# Patient Record
Sex: MALE | Race: WHITE | Employment: OTHER | URBAN - METROPOLITAN AREA
[De-identification: names, ages, dates, MRNs, and addresses within clinical notes are randomized per-mention and may not be internally consistent; named-entity substitution may affect disease eponyms.]

---

## 2017-10-09 ENCOUNTER — HOSPITAL ENCOUNTER (OUTPATIENT)
Dept: PHYSICAL THERAPY | Age: 57
Discharge: HOME | End: 2017-10-31
Attending: ORTHOPAEDIC SURGERY | Admitting: ORTHOPAEDIC SURGERY

## 2017-10-09 ENCOUNTER — OFFICE VISIT (OUTPATIENT)
Dept: ORTHOPEDIC SURGERY | Age: 57
End: 2017-10-09

## 2017-10-09 VITALS
HEIGHT: 72 IN | WEIGHT: 220 LBS | DIASTOLIC BLOOD PRESSURE: 75 MMHG | BODY MASS INDEX: 29.8 KG/M2 | SYSTOLIC BLOOD PRESSURE: 134 MMHG

## 2017-10-09 DIAGNOSIS — M25.511 RIGHT SHOULDER PAIN, UNSPECIFIED CHRONICITY: Primary | ICD-10-CM

## 2017-10-09 DIAGNOSIS — M75.121 COMPLETE TEAR OF RIGHT ROTATOR CUFF: ICD-10-CM

## 2017-10-09 PROCEDURE — L3670 SO ACRO/CLAV CAN WEB PRE OTS: HCPCS | Performed by: ORTHOPAEDIC SURGERY

## 2017-10-09 PROCEDURE — 73030 X-RAY EXAM OF SHOULDER: CPT | Performed by: ORTHOPAEDIC SURGERY

## 2017-10-09 PROCEDURE — 99203 OFFICE O/P NEW LOW 30 MIN: CPT | Performed by: ORTHOPAEDIC SURGERY

## 2017-10-09 NOTE — PROGRESS NOTES
time, place and person. The patient's mood and affect are appropriate. Neurological: The patient has good coordination. There is no weakness or sensory deficit. Shoulder Examination:  Inspection:  No gross deformities noted. No atrophy, erythema or ecchymosis. Skin is intact. Palpation:  Slightly tenderness to palpation diffusely    Range of Motion:  Significantly limited range of motion. Forward flexion to approximately 80°, abduction to approximately 45°    Strength:  Generalized weakness noted    Special Tests:  No gross instability noted    Skin: There are no rashes, ulcerations or lesions. Additional Comments: Sensation is intact to light touch throughout the median, ulnar and radial nerve distribution. Able to wiggle fingers, give thumbs up, A-OK and cross index and middle fingers. Additional Examinations:  Left Upper Extremity: Examination of the left upper extremity does not show any tenderness, deformity or injury. Range of motion is within normal limits. There is no gross instability. There are no rashes, ulcerations or lesions. Strength and tone are normal.    XR SHOULDER RIGHT STANDARD  Diagnostic Test Findings:    4 views of the right shoulder show minimal arthritic changes, no fracture or dislocation    MRI of the right shoulder from October 5, 2017 shows a complete tear of the supraspinatus tendon, as well as a subscapularis tear of the middle 3rd. Assessment:  Right shoulder rotator cuff tear    Impression:  Encounter Diagnoses   Name Primary?  Right shoulder pain, unspecified chronicity Yes    Complete tear of right rotator cuff        Office Procedures:  Orders Placed This Encounter   Procedures    XR SHOULDER RIGHT (MIN 2 VIEWS)     [id number]     Order Specific Question:   Reason for exam:     Answer:   Pain       Treatment Plan:  After going over the MRI results with the patient today is elected to proceed with surgical intervention.   We'll plan for a right shoulder video arthroscopy with rotator cuff repair and subacromial decompression. After discussion today, the patient has elected to proceed with surgical intervention. The surgical procedure was discussed in detail. The risks and possible complications of the surgery in general, as well as those directly related to this procedure were reviewed today. General risks include but are not limited to persistent pain, infection, excessive blood loss, neurovascular injury, chronic swelling or edema, and the potential need for additional treatment or surgical intervention in the future. The patient understands that, again, the risks and possible complications are not limited to those specifically stated above. In addition today, we discussed the preoperative and postoperative protocol, the often lengthy recovery, and the expectation that the patient will be compliant with instructions and perform the appropriate rehab program as prescribed. The patient accepted the above risks, possible complications, and protocol and elects to proceed. All questions were answered appropriately, and they understand that they can contact the office at any time to further discuss any questions or concerns. Steven Ghotra PA-C, scribing for Dr. Rico Rivera        This dictation was performed with a verbal recognition program [facility]) and it was checked for errors. It is possible that there are still dictated errors within this office note. If so, please bring any errors to my attention for an addendum. All efforts were made to ensure that this office note is accurate.

## 2017-10-11 ENCOUNTER — TELEPHONE (OUTPATIENT)
Dept: ORTHOPEDIC SURGERY | Age: 57
End: 2017-10-11

## 2017-10-11 NOTE — TELEPHONE ENCOUNTER
CPT [id number], [id number] [facility] # [id number]    Date 10-31-17  Rt shoulder

## 2017-10-11 NOTE — ADDENDUM NOTE
Encounter addended by: Debbe Leventhal on: 10/11/2017  8:39 AM<BR>    Actions taken: Letter status changed

## 2017-10-31 ENCOUNTER — HOSPITAL ENCOUNTER (OUTPATIENT)
Dept: SURGERY | Age: 57
Discharge: HOME | End: 2017-10-31
Attending: ORTHOPAEDIC SURGERY | Admitting: ORTHOPAEDIC SURGERY

## 2017-10-31 VITALS
BODY MASS INDEX: 31.64 KG/M2 | HEART RATE: 78 BPM | DIASTOLIC BLOOD PRESSURE: 58 MMHG | SYSTOLIC BLOOD PRESSURE: 131 MMHG | TEMPERATURE: 97.2 F | RESPIRATION RATE: 20 BRPM | WEIGHT: 221 LBS | OXYGEN SATURATION: 99 % | HEIGHT: 70 IN

## 2017-10-31 RX ORDER — MORPHINE SULFATE 2 MG/ML
2 INJECTION, SOLUTION INTRAMUSCULAR; INTRAVENOUS EVERY 5 MIN PRN
Status: DISCONTINUED | OUTPATIENT
Start: 2017-10-31 | End: 2017-11-01 | Stop reason: HOSPADM

## 2017-10-31 RX ORDER — ACETAMINOPHEN 10 MG/ML
INJECTION, SOLUTION INTRAVENOUS
Status: COMPLETED
Start: 2017-10-31 | End: 2017-10-31

## 2017-10-31 RX ORDER — SODIUM CHLORIDE 0.9 % (FLUSH) 0.9 %
10 SYRINGE (ML) INJECTION PRN
Status: DISCONTINUED | OUTPATIENT
Start: 2017-10-31 | End: 2017-11-01 | Stop reason: HOSPADM

## 2017-10-31 RX ORDER — OXYCODONE HYDROCHLORIDE AND ACETAMINOPHEN 5; 325 MG/1; MG/1
1 TABLET ORAL EVERY 6 HOURS PRN
Qty: 28 TABLET | Refills: 0 | Status: SHIPPED | OUTPATIENT
Start: 2017-10-31 | End: 2017-11-07

## 2017-10-31 RX ORDER — LIDOCAINE HYDROCHLORIDE 10 MG/ML
1 INJECTION, SOLUTION EPIDURAL; INFILTRATION; INTRACAUDAL; PERINEURAL
Status: ACTIVE | OUTPATIENT
Start: 2017-10-31 | End: 2017-10-31

## 2017-10-31 RX ORDER — PROMETHAZINE HYDROCHLORIDE 25 MG/ML
6.25 INJECTION, SOLUTION INTRAMUSCULAR; INTRAVENOUS
Status: ACTIVE | OUTPATIENT
Start: 2017-10-31 | End: 2017-10-31

## 2017-10-31 RX ORDER — SODIUM CHLORIDE 0.9 % (FLUSH) 0.9 %
10 SYRINGE (ML) INJECTION EVERY 12 HOURS SCHEDULED
Status: DISCONTINUED | OUTPATIENT
Start: 2017-10-31 | End: 2017-11-01 | Stop reason: HOSPADM

## 2017-10-31 RX ORDER — LABETALOL HYDROCHLORIDE 5 MG/ML
5 INJECTION, SOLUTION INTRAVENOUS EVERY 10 MIN PRN
Status: DISCONTINUED | OUTPATIENT
Start: 2017-10-31 | End: 2017-11-01 | Stop reason: HOSPADM

## 2017-10-31 RX ORDER — OXYCODONE HYDROCHLORIDE AND ACETAMINOPHEN 5; 325 MG/1; MG/1
1 TABLET ORAL PRN
Status: COMPLETED | OUTPATIENT
Start: 2017-10-31 | End: 2017-10-31

## 2017-10-31 RX ORDER — ACETAMINOPHEN 10 MG/ML
1000 INJECTION, SOLUTION INTRAVENOUS ONCE
Status: DISCONTINUED | OUTPATIENT
Start: 2017-10-31 | End: 2017-11-01 | Stop reason: HOSPADM

## 2017-10-31 RX ORDER — MORPHINE SULFATE 2 MG/ML
1 INJECTION, SOLUTION INTRAMUSCULAR; INTRAVENOUS EVERY 5 MIN PRN
Status: DISCONTINUED | OUTPATIENT
Start: 2017-10-31 | End: 2017-11-01 | Stop reason: HOSPADM

## 2017-10-31 RX ORDER — MEPERIDINE HYDROCHLORIDE 50 MG/ML
12.5 INJECTION INTRAMUSCULAR; INTRAVENOUS; SUBCUTANEOUS EVERY 5 MIN PRN
Status: DISCONTINUED | OUTPATIENT
Start: 2017-10-31 | End: 2017-11-01 | Stop reason: HOSPADM

## 2017-10-31 RX ORDER — HYDRALAZINE HYDROCHLORIDE 20 MG/ML
5 INJECTION INTRAMUSCULAR; INTRAVENOUS EVERY 10 MIN PRN
Status: DISCONTINUED | OUTPATIENT
Start: 2017-10-31 | End: 2017-11-01 | Stop reason: HOSPADM

## 2017-10-31 RX ORDER — SODIUM CHLORIDE, SODIUM LACTATE, POTASSIUM CHLORIDE, CALCIUM CHLORIDE 600; 310; 30; 20 MG/100ML; MG/100ML; MG/100ML; MG/100ML
INJECTION, SOLUTION INTRAVENOUS CONTINUOUS
Status: DISCONTINUED | OUTPATIENT
Start: 2017-10-31 | End: 2017-11-01 | Stop reason: HOSPADM

## 2017-10-31 RX ORDER — ONDANSETRON 2 MG/ML
4 INJECTION INTRAMUSCULAR; INTRAVENOUS
Status: COMPLETED | OUTPATIENT
Start: 2017-10-31 | End: 2017-10-31

## 2017-10-31 RX ORDER — DIPHENHYDRAMINE HYDROCHLORIDE 50 MG/ML
12.5 INJECTION INTRAMUSCULAR; INTRAVENOUS
Status: ACTIVE | OUTPATIENT
Start: 2017-10-31 | End: 2017-10-31

## 2017-10-31 RX ORDER — OXYCODONE HYDROCHLORIDE AND ACETAMINOPHEN 5; 325 MG/1; MG/1
2 TABLET ORAL PRN
Status: COMPLETED | OUTPATIENT
Start: 2017-10-31 | End: 2017-10-31

## 2017-10-31 RX ADMIN — ACETAMINOPHEN: 10 INJECTION, SOLUTION INTRAVENOUS at 07:48

## 2017-10-31 RX ADMIN — SODIUM CHLORIDE, SODIUM LACTATE, POTASSIUM CHLORIDE, CALCIUM CHLORIDE: 600; 310; 30; 20 INJECTION, SOLUTION INTRAVENOUS at 07:47

## 2017-10-31 RX ADMIN — ONDANSETRON 4 MG: 2 INJECTION INTRAMUSCULAR; INTRAVENOUS at 13:23

## 2017-10-31 RX ADMIN — OXYCODONE HYDROCHLORIDE AND ACETAMINOPHEN 1 TABLET: 5; 325 TABLET ORAL at 13:15

## 2017-10-31 ASSESSMENT — PAIN SCALES - GENERAL
PAINLEVEL_OUTOF10: 7
PAINLEVEL_OUTOF10: 5
PAINLEVEL_OUTOF10: 7

## 2017-10-31 ASSESSMENT — PAIN - FUNCTIONAL ASSESSMENT: PAIN_FUNCTIONAL_ASSESSMENT: 0-10

## 2017-10-31 ASSESSMENT — PAIN DESCRIPTION - DESCRIPTORS: DESCRIPTORS: ACHING

## 2017-10-31 NOTE — PROGRESS NOTES
Dr Kenton Allen called and informed that patient has been drowsy but is C/O posterior shoulder pain. Dr Kenton Allen states to give the patient Percocet for the pain.

## 2017-10-31 NOTE — PROGRESS NOTES
Block Time Out   1716      Verified   Correct Pt.   Correct   Correct Procedure  Correct Site  Correct Extremity

## 2017-10-31 NOTE — PROGRESS NOTES
Discharge instructions reviewed with patient/responsible adult and understanding verbalized. Discharge instructions signed and copies given. Patient discharged home with belongings. Prescription x 1 sent with pt and/or family. Medication information side effect sheet given to patient.

## 2017-10-31 NOTE — PROGRESS NOTES
Advanced pt from lying to sitting without adverse event/pt denies complaints of dizziness/ponv/ RESP  WNL/ surgical drsg/circulation checks on operative limb unchanged from my  last pacu assessment entry     Pt states pain is at a tolerable level-4     instructed pt if no void in 8 hours contact physician or go to ER    Discharge instructions reviewed with patient/responsible adult and understanding verbalized. Discharge instructions signed and copies given. Rx    percocet                Given to pts responsible adult/instructed not to drive/ingest alcohol while taking narcotic medications. Instructed pt/family member Last dose of narcotics given in recovery room was        Percocet  Medication information  sheet given to pt/family-all questions answered     Please follow narcotic administration as prescribed by your surgeon- any questions call your surgeon. If you have any problems contact your surgeon and  Go to the emergency room.     Operative drsg unchanged from my initial pacu assessment    Assisted pt to passenger side of car in no acute distress-/  with significant other as - VSS upon d/c

## 2017-10-31 NOTE — ANESTHESIA PRE-OP
Department of Anesthesiology  Preprocedure Note       Name:  Abdiel Farmer   Age:  62 y.o.  :  1960                                          MRN:  [id number]         Date:  10/31/2017      Surgeon:    Procedure:    Medications prior to admission:   Prior to Admission medications    Medication Sig Start Date End Date Taking? Authorizing Provider   amLODIPine (NORVASC) 5 MG tablet Take 5 mg by mouth daily   Yes Historical Provider, MD   lisinopril (PRINIVIL;ZESTRIL) 5 MG tablet Take 5 mg by mouth daily   Yes Historical Provider, MD       Current medications:    Current Outpatient Prescriptions   Medication Sig Dispense Refill    amLODIPine (NORVASC) 5 MG tablet Take 5 mg by mouth daily      lisinopril (PRINIVIL;ZESTRIL) 5 MG tablet Take 5 mg by mouth daily       Current Facility-Administered Medications   Medication Dose Route Frequency Provider Last Rate Last Dose    lactated ringers infusion   Intravenous Continuous Campbell Roberts  mL/hr at 10/31/17 0747      sodium chloride flush 0.9 % injection 10 mL  10 mL Intravenous 2 times per day Campbell Roberts MD        sodium chloride flush 0.9 % injection 10 mL  10 mL Intravenous PRN Campbell Roberts MD        lidocaine PF 1 % injection 1 mL  1 mL Intradermal Once PRN Campbell Roberts MD        ceFAZolin (ANCEF) 2 g in dextrose 5 % 100 mL IVPB  2 g Intravenous On Call to [address] Alvarado MD        acetaminophen (IRMEV) infusion 1,000 mg  1,000 mg Intravenous Once Lalito Sandoval MD           Allergies: Allergies   Allergen Reactions    Penicillins        Problem List:  There is no problem list on file for this patient.       Past Medical History:        Diagnosis Date    Hypertension        Past Surgical History:        Procedure Laterality Date    KNEE ARTHROSCOPY      X 3    LIPOMA RESECTION      PILONIDAL CYST EXCISION         Social History:    Social History   Substance Use Topics    Smoking status:

## 2017-11-01 ENCOUNTER — HOSPITAL ENCOUNTER (OUTPATIENT)
Dept: PHYSICAL THERAPY | Age: 57
Discharge: HOME | End: 2017-11-30
Attending: ORTHOPAEDIC SURGERY | Admitting: ORTHOPAEDIC SURGERY

## 2017-11-01 NOTE — OP NOTE
[id number] [facility]                               OPERATIVE REPORT    PATIENT NAME: Loreto Mtz                         :             1960  MED REC NO:   [id number]                           ROOM:  ACCOUNT NO:   [de-identified]                           ADMISSION DATE:  10/31/2017  PROVIDER:     Nay Simons MD    DATE OF PROCEDURE:  10/31/2017    PREOPERATIVE DIAGNOSES:  1. Right shoulder rotator cuff tear involving the supraspinatus and  subscapularis tendons. 2.  Right shoulder biceps tendon tear. 3.  Right shoulder superior, anterior labral tears as well as  intraarticular synovitis. 4.  Right shoulder impingement syndrome with outlet stenosis. POSTOPERATIVE DIAGNOSES:  1. Right shoulder rotator cuff tear involving the supraspinatus and  subscapularis tendons. 2.  Right shoulder biceps tendon tear. 3.  Right shoulder superior, anterior labral tears as well as  intraarticular synovitis. 4.  Right shoulder impingement syndrome with outlet stenosis. PROCEDURE:  1.  Video arthroscopy of the right shoulder with an arthroscopic  rotator cuff repair involving both the subscapularis tendon as well as  the supraspinatus tendon, code number 76037.  2.  Video arthroscopy of the right shoulder with an arthroscopic  biceps tenodesis, code number is 72952.  3.  Right shoulder video arthroscopy with an arthroscopic  intraarticular extensive debridement involving debridement of the  anterior and superior labral tear, debridement of the hypertrophic  synovitis and debridement of the rotator interval, code number 18450.  4.  Right shoulder video arthroscopy, arthroscopic subacromial  decompression, and partial acromioplasty, code number 13423. SURGEON:  Nay Simons MD.    FIRST ASSISTANT:  Clelia Babinski, [address]. ANESTHESIA:  General with an interscalene block.     ESTIMATED BLOOD LOSS: Negligible. COMPLICATIONS:  No complications. INDICATIONS:  This is a gentleman who is 62years of age, has had  significant problems with his right shoulder. He is a very excessive  manual labor type worker. He slipped on lid of a _____ bucket, fell  and landed directly on his right shoulder in September 2017, having a  severe injury and pain ever since that time. MRI scan documented the  above findings and we elected to proceed with surgical intervention. He agreed to that procedure, the risk, the possible complications, the  postop protocol, and long period of recovery and rehabilitation were  all discussed and understood. He understands that he must be very  compliant with the postoperative protocol for this to be effective and  that he has to work hard on regaining his muscle strength for it to be  a useful arm postoperatively. OPERATIVE PROCEDURE:  He was seen in the holding area by the  Anesthesia Department and interscalene block was placed. He was then  taken to the operating room where general anesthesia was induced. He  was turned to the left lateral decubitus position. The right shoulder  prepped with ChloraPrep and draped sterilely. The posterior  mid-glenoid portal was made. The arthroscope was introduced into the  glenohumeral joint. In the joint, there was a significant amount of  damage identified. First of all, he has a grade 2 and 3  chondromalacia both the humeral head and the glenoid. He has labral  tear. It has been involved in the entire superior labrum as well as  the anterior labrum. Significant tearing of the intraarticular  portion of the biceps tendon with multiple fraying and at least 75% of  the biceps tendon torn. He also had a tear of the superior one half  of the subscapularis tendon. The biceps tendon had fallen down into  that tear. And then he had a full thickness tear of the entire  supraspinatus.   So, in the intraarticular space, we first made  anterior portal and an anterior superior portal, extensive debridement  was performed including debridement of hypertrophic synovitis, which  was throughout the shoulder, but worse in the anterior aspect of the  shoulder. The entire rotator interval was debrided to visualize the  coracoid process. We then debrided the superior and anterior labral  tears and debrided the intraarticular portion of the biceps, which was  torn. At that point, we elected to proceed with repair of the  subscapularis. That was done visualizing intraarticular. Smith and  Nephew tape was used and passed in a cinched fashion through the  superior one half of the subscapularis. We identified the footprint  on the superior one half of the lesser tuberosity. That was debrided  inward down to bleeding bone. Knotless anchor were then placed  anteriorly through an anterior portal to repair the subscapularis. Once that was completed, we proceeded to the subacromial space. In  the subacromial space again complete full thickness supraspinatus  rotator cuff tear with some retraction. There was a significant  subacromial outlet stenosis in an anterior type 3 acromion. A  subacromial decompression, and partial acromioplasty was then  performed. We then began the biceps tenodesis. The biceps tendon was  released from inside the joint. Two cinched sutures were placed in  the biceps and then using a knotless anchor, we proceeded with biceps  tenodesis in the subacromial space. Then proceeded with multiple  anchors for repair of the supraspinatus tendon to the greater  tuberosity. Once we had completed the repair of the supraspinatus, we  reinspected the entire subacromial space and then back to the joint  reinspected the intraarticular space, no other significant pathology  was identified. The arthroscope was removed. The portals were all  closed with nylon suture. Sterile dressing applied.   He was awakened  and transported to

## 2017-11-10 ENCOUNTER — OFFICE VISIT (OUTPATIENT)
Dept: ORTHOPEDIC SURGERY | Age: 57
End: 2017-11-10

## 2017-11-10 VITALS
HEART RATE: 62 BPM | BODY MASS INDEX: 29.8 KG/M2 | DIASTOLIC BLOOD PRESSURE: 75 MMHG | SYSTOLIC BLOOD PRESSURE: 131 MMHG | HEIGHT: 72 IN | WEIGHT: 220 LBS

## 2017-11-10 DIAGNOSIS — M75.121 COMPLETE TEAR OF RIGHT ROTATOR CUFF: ICD-10-CM

## 2017-11-10 DIAGNOSIS — M25.511 RIGHT SHOULDER PAIN, UNSPECIFIED CHRONICITY: Primary | ICD-10-CM

## 2017-11-10 PROCEDURE — 73030 X-RAY EXAM OF SHOULDER: CPT | Performed by: PHYSICIAN ASSISTANT

## 2017-11-10 PROCEDURE — 99024 POSTOP FOLLOW-UP VISIT: CPT | Performed by: PHYSICIAN ASSISTANT

## 2017-11-10 NOTE — PROGRESS NOTES
the right shoulder show normal post-operative changes following an arthroscopy with rotator cuff repair involving both the supraspinatus and subscapularis, arthroscopic biceps tenodesis, extensive debridement of labral tear and synovitis and subacromial decompression. Assessment: Progressing well post op from right shoulder arthroscopy with rotator cuff repair involving both the supraspinatus and subscapularis, arthroscopic biceps tenodesis, extensive debridement of labral tear and synovitis and subacromial decompression. Date of surgery: 10/31/2017    Impression:  Encounter Diagnoses   Name Primary?  Right shoulder pain, unspecified chronicity Yes    Complete tear of right rotator cuff          Treatment Plan:  Sutures were removed and steri-strips applied. Instructions for sling usage and progressive mobilization of shoulder were reviewed. She will remain in sling with abduction pillow for a total of 3 weeks postop. He can discontinue the use of his pillow at that time but should remain in regular sling for another 3 weeks  Specific precautions were reviewed and emphasized. Patient will continue outpatient physical therapy. Passive range of motion only for 6 weeks postop and should continue to protect the biceps. He is attending physical therapy and help her out and only has a limited number of visits due to his insurance so he is okay to cut back to every other week at this point. Follow up appointment was arranged at patient's convenience in approx 3wks. This dictation was performed with a verbal recognition program ([employer]) and it was checked for errors. It is possible that there are still dictated errors within this office note. If so, please bring any errors to my attention for an addendum. All efforts were made to ensure that this office note is accurate.

## 2017-11-30 ENCOUNTER — OFFICE VISIT (OUTPATIENT)
Dept: ORTHOPEDIC SURGERY | Age: 57
End: 2017-11-30

## 2017-11-30 VITALS — HEIGHT: 72 IN | BODY MASS INDEX: 29.8 KG/M2 | WEIGHT: 220.02 LBS

## 2017-11-30 DIAGNOSIS — M75.121 COMPLETE TEAR OF RIGHT ROTATOR CUFF: Primary | ICD-10-CM

## 2017-11-30 PROCEDURE — 99024 POSTOP FOLLOW-UP VISIT: CPT | Performed by: PHYSICIAN ASSISTANT

## 2017-11-30 NOTE — PROGRESS NOTES
Chief Complaint  Shoulder Pain (right shoulder postop)    Post op right shoulder arthroscopy with rotator cuff repair involving both the supraspinatus and subscapularis, arthroscopic biceps tenodesis, extensive debridement of labral tear and synovitis and subacromial decompression  Date of surgery: 10/31/2017       History of Present Illness:  Marcelina Meigs is a 62 y.o. male  Here for follow up of right shoulder arthroscopy with rotator cuff repair involving both the supraspinatus and subscapularis, arthroscopic biceps tenodesis, extensive debridement of labral tear and synovitis and subacromial decompression. Progressing well. Has continued outpatient physical therapy (in [place]). The patient's pain is rated at 0/10. The patient denies fever, wound drainage, increasing redness, pus, increasing swelling. Post op problems reported: none. Taking oral pain medication as needed (OTC only). Using local cold therapy as needed. Using sling as directed today, but admits that he has not been wearing it much around the house or when he sleeps in a chair at night. Vital Signs: There were no vitals filed for this visit. Pain Assessment:         Shoulder Examination  Patient is awake, alert, and in no acute distress. Portals have healed well  Skin is intact. Minimal ecchymosis. Sling was removed for exam.  Distal circulatory status and neurologic status is intact. The wound is clean, dry, healed. There is no warmth, erythema, or purulent drainage over the incision. Sensation is intact to light touch in the axillary, median, radial, and ulnar nerve distribution. The EPL, FPL, and interossei are grossly intact. The right upper extremity is warm and well-perfused with brisk capillary refill. Patient tolerated gentle passive range of motion. ROM has been progressing.     None  Diagnostic Test Findings:   No new x-rays taken today      Assessment: Progressing well post op from right shoulder arthroscopy

## 2017-12-28 ENCOUNTER — OFFICE VISIT (OUTPATIENT)
Dept: ORTHOPEDIC SURGERY | Age: 57
End: 2017-12-28

## 2017-12-28 VITALS — BODY MASS INDEX: 29.8 KG/M2 | HEIGHT: 72 IN | WEIGHT: 220.02 LBS

## 2017-12-28 DIAGNOSIS — M75.121 COMPLETE TEAR OF RIGHT ROTATOR CUFF: Primary | ICD-10-CM

## 2017-12-28 PROCEDURE — 99024 POSTOP FOLLOW-UP VISIT: CPT | Performed by: PHYSICIAN ASSISTANT

## 2017-12-28 NOTE — PROGRESS NOTES
Chief Complaint  Post-Op Check (right shoulder)    Post op right shoulder arthroscopy with rotator cuff repair involving both the supraspinatus and subscapularis, arthroscopic biceps tenodesis, extensive debridement of labral tear and synovitis and subacromial decompression  Date of surgery: 10/31/2017       History of Present Illness:  Dontae Roldan is a 62 y.o. male  Here for follow up of right shoulder arthroscopy with rotator cuff repair involving both the supraspinatus and subscapularis, arthroscopic biceps tenodesis, extensive debridement of labral tear and synovitis and subacromial decompression. Progressing well. Has continued outpatient physical therapy (in [place]). The patient's pain is rated at 0/10. The patient denies fever, wound drainage, increasing redness, pus, increasing swelling. Post op problems reported: none. Taking oral pain medication as needed (OTC only). Using local cold therapy as needed. Patient is no longer wearing his sling. 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 Been trying to be safe with his shoulder but admits that he may be doing a little more than he supposed to. Vital Signs: There were no vitals filed for this visit. Pain Assessment:       Shoulder Examination  Patient is awake, alert, and in no acute distress. Portals have healed well  Skin is intact. Minimal ecchymosis. Distal circulatory status and neurologic status is intact. The wound is clean, dry, healed. There is no warmth, erythema, or purulent drainage over the incision. Sensation is intact to light touch in the axillary, median, radial, and ulnar nerve distribution. The EPL, FPL, and interossei are grossly intact. The right upper extremity is warm and well-perfused with brisk capillary refill. Patient tolerated gentle passive range of motion. ROM has been progressing. Active forward flexion to approximately 90° passively he can go a little further. Active abduction to approximately 90° passively maybe a few degrees more. Weakness noted with internal and external rotation but is improving. None  Diagnostic Test Findings:   No new x-rays taken today      Assessment: Progressing well post op from right shoulder arthroscopy with rotator cuff repair involving both the supraspinatus and subscapularis, arthroscopic biceps tenodesis, extensive debridement of labral tear and synovitis and subacromial decompression. Date of surgery: 10/31/2017    Impression:  Encounter Diagnosis   Name Primary?  Complete tear of right rotator cuff Yes         Treatment Plan:    Specific precautions were reviewed and emphasized. Patient will continue outpatient physical therapy. Patient will continue active range of motion and gentle strengthening. He is currently attending 3 times a week and we have instructed him that it was okay to cut back to once or twice a week. Patient will follow-up with us in approximately 6 weeks and we can reevaluate him once again. This dictation was performed with a verbal recognition program ([employer]ON) and it was checked for errors. It is possible that there are still dictated errors within this office note. If so, please bring any errors to my attention for an addendum. All efforts were made to ensure that this office note is accurate.

## 2018-02-12 ENCOUNTER — OFFICE VISIT (OUTPATIENT)
Dept: ORTHOPEDIC SURGERY | Age: 58
End: 2018-02-12

## 2018-02-12 VITALS — BODY MASS INDEX: 29.8 KG/M2 | HEIGHT: 72 IN | WEIGHT: 220 LBS

## 2018-02-12 DIAGNOSIS — M75.121 COMPLETE TEAR OF RIGHT ROTATOR CUFF: Primary | ICD-10-CM

## 2018-02-12 PROCEDURE — 99213 OFFICE O/P EST LOW 20 MIN: CPT | Performed by: ORTHOPAEDIC SURGERY

## 2018-02-12 PROCEDURE — G8419 CALC BMI OUT NRM PARAM NOF/U: HCPCS | Performed by: ORTHOPAEDIC SURGERY

## 2018-02-12 PROCEDURE — G8427 DOCREV CUR MEDS BY ELIG CLIN: HCPCS | Performed by: ORTHOPAEDIC SURGERY

## 2018-02-12 PROCEDURE — G8484 FLU IMMUNIZE NO ADMIN: HCPCS | Performed by: ORTHOPAEDIC SURGERY

## 2018-02-12 PROCEDURE — 1036F TOBACCO NON-USER: CPT | Performed by: ORTHOPAEDIC SURGERY

## 2018-02-12 PROCEDURE — 3017F COLORECTAL CA SCREEN DOC REV: CPT | Performed by: ORTHOPAEDIC SURGERY

## 2018-02-12 NOTE — PROGRESS NOTES
Progressing well post op from right shoulder arthroscopy with rotator cuff repair involving both the supraspinatus and subscapularis, arthroscopic biceps tenodesis, extensive debridement of labral tear and synovitis and subacromial decompression. Date of surgery: 10/31/2017    Impression:  Encounter Diagnosis   Name Primary?  Complete tear of right rotator cuff Yes         Treatment Plan:    Specific precautions were reviewed and emphasized. Patient will continue to work on HEP and will follow up with us on a prn basis    Dayna Gonzalez PA-C, scribing for Dr. Jaspreet Berkowitz        This dictation was performed with a verbal recognition program ([employer]AGON) and it was checked for errors. It is possible that there are still dictated errors within this office note. If so, please bring any errors to my attention for an addendum. All efforts were made to ensure that this office note is accurate.

## 2020-08-26 ENCOUNTER — OFFICE VISIT (OUTPATIENT)
Dept: ORTHOPEDIC SURGERY | Age: 60
End: 2020-08-26
Payer: COMMERCIAL

## 2020-08-26 VITALS — HEIGHT: 72 IN | WEIGHT: 220 LBS | BODY MASS INDEX: 29.8 KG/M2

## 2020-08-26 PROCEDURE — 1036F TOBACCO NON-USER: CPT | Performed by: ORTHOPAEDIC SURGERY

## 2020-08-26 PROCEDURE — G8427 DOCREV CUR MEDS BY ELIG CLIN: HCPCS | Performed by: ORTHOPAEDIC SURGERY

## 2020-08-26 PROCEDURE — 99213 OFFICE O/P EST LOW 20 MIN: CPT | Performed by: ORTHOPAEDIC SURGERY

## 2020-08-26 PROCEDURE — G8419 CALC BMI OUT NRM PARAM NOF/U: HCPCS | Performed by: ORTHOPAEDIC SURGERY

## 2020-08-26 PROCEDURE — 3017F COLORECTAL CA SCREEN DOC REV: CPT | Performed by: ORTHOPAEDIC SURGERY

## 2020-08-26 RX ORDER — PREDNISONE 20 MG/1
TABLET ORAL
Status: ON HOLD | COMMUNITY
Start: 2020-07-28 | End: 2020-10-26

## 2020-08-26 NOTE — PROGRESS NOTES
Emotionally abused: None     Physically abused: None     Forced sexual activity: None   Other Topics Concern    None   Social History Narrative    None     Current Outpatient Medications   Medication Sig Dispense Refill    predniSONE (DELTASONE) 20 MG tablet TAKE TWO TABLETS BY MOUTH EVERY DAY FOR FIVE DAYS      amLODIPine (NORVASC) 5 MG tablet Take 5 mg by mouth daily      lisinopril (PRINIVIL;ZESTRIL) 5 MG tablet Take 5 mg by mouth daily       No current facility-administered medications for this visit. Allergies   Allergen Reactions    Penicillins        ROS:  ROS neg except for positives in HPI    PHYSICAL EXAMINATION:    Gen/Psych: Examination reveals a pleasant individual in no acute distress. The patient is oriented to time, place and person. The patient's mood and affect are appropriate. Lymph: The lymphatic examination bilaterally reveals all areas to be without enlargement or induration. Skin intact without lymphadenopathy, discoloration, or abnormal temperature. Vascular: There is intact, symmetric circulation in both upper extremities. Musculoskeletal:       Cervical spine, shoulders, elbows, wrist:  satisfactory comfortable baseline range of motion, strength, and stability bilaterally    Right and left hand and digits:   Satisfactory range of motion bilaterally. Possible mild atrophy right hand. No atrophy or clawing left hand. Neurological:  Direct compression, Tinel's, and Phalen's testing reproduces the patient's symptoms into the median nerve distribution over the right and left carpal tunnel    Radiology:         DIAGNOSTIC TESTING: EMG: were not done      IMPRESSION AND PLAN: Bilateral hand numbness    Likely Bilateral carpal tunnel syndrome.   I have reviewed the diagnosis and evidence based treatment options for the patient, both surgical and non-surgical.      We will proceed with EMG test of both upper extremities to assess for carpal tunnel syndrome. Rule out cervical involvement. Assess severity. Follow-up after nerve testing to discuss results and treatment options. All questions and concerns were addressed today. Patient is in agreement with the plan. Jorge L Mora MD  Hand & Upper Extremity Surgery  [address]  A partner of [facility] ([facility])        Please note that this transcription was created using voice recognition software. Any errors are unintentional and may be due to voice recognition transcription.

## 2020-09-25 ENCOUNTER — OFFICE VISIT (OUTPATIENT)
Dept: ORTHOPEDIC SURGERY | Age: 60
End: 2020-09-25
Payer: COMMERCIAL

## 2020-09-25 VITALS — HEIGHT: 72 IN | WEIGHT: 220 LBS | BODY MASS INDEX: 29.8 KG/M2

## 2020-09-25 PROCEDURE — 99213 OFFICE O/P EST LOW 20 MIN: CPT | Performed by: ORTHOPAEDIC SURGERY

## 2020-09-25 PROCEDURE — 1036F TOBACCO NON-USER: CPT | Performed by: ORTHOPAEDIC SURGERY

## 2020-09-25 PROCEDURE — 3017F COLORECTAL CA SCREEN DOC REV: CPT | Performed by: ORTHOPAEDIC SURGERY

## 2020-09-25 PROCEDURE — G8427 DOCREV CUR MEDS BY ELIG CLIN: HCPCS | Performed by: ORTHOPAEDIC SURGERY

## 2020-09-25 PROCEDURE — G8419 CALC BMI OUT NRM PARAM NOF/U: HCPCS | Performed by: ORTHOPAEDIC SURGERY

## 2020-09-25 NOTE — PROGRESS NOTES
procedure under anesthesia. All questions and concerns were addressed today. Patient is in agreement with the plan.         Constanza Zheng MD  Hand & Upper Extremity Surgery  [facility] [place] [facility] ([facility])

## 2020-10-12 ENCOUNTER — TELEPHONE (OUTPATIENT)
Dept: ORTHOPEDIC SURGERY | Age: 60
End: 2020-10-12

## 2020-10-12 NOTE — TELEPHONE ENCOUNTER
Auth: # [id number]    Date: 10/26/2020 thru 1/24/2021  Type of SX:  Outpatient  Location: [facility]  CPT: 39445, [id number]   DX Code: G56.03, G56.23  SX area: [facility]  Insurance: [employer]

## 2020-10-20 ENCOUNTER — HOSPITAL ENCOUNTER (OUTPATIENT)
Age: 60
Discharge: HOME | End: 2020-10-20
Payer: COMMERCIAL

## 2020-10-20 PROCEDURE — U0003 INFECTIOUS AGENT DETECTION BY NUCLEIC ACID (DNA OR RNA); SEVERE ACUTE RESPIRATORY SYNDROME CORONAVIRUS 2 (SARS-COV-2) (CORONAVIRUS DISEASE [COVID-19]), AMPLIFIED PROBE TECHNIQUE, MAKING USE OF HIGH THROUGHPUT TECHNOLOGIES AS DESCRIBED BY CMS-2020-01-R: HCPCS

## 2020-10-21 LAB — SARS-COV-2: NOT DETECTED

## 2020-10-24 ENCOUNTER — ANESTHESIA EVENT (OUTPATIENT)
Dept: OPERATING ROOM | Age: 60
End: 2020-10-24
Payer: COMMERCIAL

## 2020-10-26 ENCOUNTER — HOSPITAL ENCOUNTER (OUTPATIENT)
Age: 60
Setting detail: OUTPATIENT SURGERY
Discharge: HOME | End: 2020-10-26
Attending: ORTHOPAEDIC SURGERY | Admitting: ORTHOPAEDIC SURGERY
Payer: COMMERCIAL

## 2020-10-26 ENCOUNTER — ANESTHESIA (OUTPATIENT)
Dept: OPERATING ROOM | Age: 60
End: 2020-10-26
Payer: COMMERCIAL

## 2020-10-26 VITALS
TEMPERATURE: 97 F | RESPIRATION RATE: 16 BRPM | OXYGEN SATURATION: 95 % | HEIGHT: 72 IN | SYSTOLIC BLOOD PRESSURE: 133 MMHG | HEART RATE: 66 BPM | WEIGHT: 220 LBS | BODY MASS INDEX: 29.8 KG/M2 | DIASTOLIC BLOOD PRESSURE: 84 MMHG

## 2020-10-26 VITALS
RESPIRATION RATE: 4 BRPM | SYSTOLIC BLOOD PRESSURE: 130 MMHG | OXYGEN SATURATION: 97 % | DIASTOLIC BLOOD PRESSURE: 59 MMHG

## 2020-10-26 PROCEDURE — 2580000003 HC RX 258: Performed by: ANESTHESIOLOGY

## 2020-10-26 PROCEDURE — 2500000003 HC RX 250 WO HCPCS: Performed by: NURSE ANESTHETIST, CERTIFIED REGISTERED

## 2020-10-26 PROCEDURE — 2709999900 HC NON-CHARGEABLE SUPPLY: Performed by: ORTHOPAEDIC SURGERY

## 2020-10-26 PROCEDURE — 3600000013 HC SURGERY LEVEL 3 ADDTL 15MIN: Performed by: ORTHOPAEDIC SURGERY

## 2020-10-26 PROCEDURE — 3700000000 HC ANESTHESIA ATTENDED CARE: Performed by: ORTHOPAEDIC SURGERY

## 2020-10-26 PROCEDURE — 2500000003 HC RX 250 WO HCPCS: Performed by: ORTHOPAEDIC SURGERY

## 2020-10-26 PROCEDURE — 3700000001 HC ADD 15 MINUTES (ANESTHESIA): Performed by: ORTHOPAEDIC SURGERY

## 2020-10-26 PROCEDURE — 6370000000 HC RX 637 (ALT 250 FOR IP): Performed by: ANESTHESIOLOGY

## 2020-10-26 PROCEDURE — 7100000000 HC PACU RECOVERY - FIRST 15 MIN: Performed by: ORTHOPAEDIC SURGERY

## 2020-10-26 PROCEDURE — 6360000002 HC RX W HCPCS: Performed by: NURSE ANESTHETIST, CERTIFIED REGISTERED

## 2020-10-26 PROCEDURE — 7100000010 HC PHASE II RECOVERY - FIRST 15 MIN: Performed by: ORTHOPAEDIC SURGERY

## 2020-10-26 PROCEDURE — 3600000003 HC SURGERY LEVEL 3 BASE: Performed by: ORTHOPAEDIC SURGERY

## 2020-10-26 PROCEDURE — 7100000011 HC PHASE II RECOVERY - ADDTL 15 MIN: Performed by: ORTHOPAEDIC SURGERY

## 2020-10-26 PROCEDURE — 6360000002 HC RX W HCPCS: Performed by: ORTHOPAEDIC SURGERY

## 2020-10-26 PROCEDURE — 7100000001 HC PACU RECOVERY - ADDTL 15 MIN: Performed by: ORTHOPAEDIC SURGERY

## 2020-10-26 RX ORDER — IBUPROFEN 200 MG
200 TABLET ORAL ONCE
Status: COMPLETED | OUTPATIENT
Start: 2020-10-26 | End: 2020-10-26

## 2020-10-26 RX ORDER — LIDOCAINE HYDROCHLORIDE 20 MG/ML
INJECTION, SOLUTION EPIDURAL; INFILTRATION; INTRACAUDAL; PERINEURAL PRN
Status: DISCONTINUED | OUTPATIENT
Start: 2020-10-26 | End: 2020-10-26 | Stop reason: SDUPTHER

## 2020-10-26 RX ORDER — BUPIVACAINE HYDROCHLORIDE 2.5 MG/ML
INJECTION, SOLUTION INFILTRATION; PERINEURAL PRN
Status: DISCONTINUED | OUTPATIENT
Start: 2020-10-26 | End: 2020-10-26 | Stop reason: ALTCHOICE

## 2020-10-26 RX ORDER — HYDROCODONE BITARTRATE AND ACETAMINOPHEN 5; 325 MG/1; MG/1
1 TABLET ORAL EVERY 8 HOURS PRN
Qty: 15 TABLET | Refills: 0 | Status: SHIPPED | OUTPATIENT
Start: 2020-10-26 | End: 2020-10-31

## 2020-10-26 RX ORDER — DEXAMETHASONE SODIUM PHOSPHATE 10 MG/ML
INJECTION INTRAMUSCULAR; INTRAVENOUS PRN
Status: DISCONTINUED | OUTPATIENT
Start: 2020-10-26 | End: 2020-10-26 | Stop reason: SDUPTHER

## 2020-10-26 RX ORDER — ONDANSETRON 2 MG/ML
INJECTION INTRAMUSCULAR; INTRAVENOUS PRN
Status: DISCONTINUED | OUTPATIENT
Start: 2020-10-26 | End: 2020-10-26 | Stop reason: SDUPTHER

## 2020-10-26 RX ORDER — IBUPROFEN 200 MG
TABLET ORAL
Status: DISCONTINUED
Start: 2020-10-26 | End: 2020-10-26 | Stop reason: HOSPADM

## 2020-10-26 RX ORDER — LIDOCAINE HYDROCHLORIDE 10 MG/ML
INJECTION, SOLUTION EPIDURAL; INFILTRATION; INTRACAUDAL; PERINEURAL
Status: COMPLETED
Start: 2020-10-26 | End: 2020-10-26

## 2020-10-26 RX ORDER — SODIUM CHLORIDE 0.9 % (FLUSH) 0.9 %
10 SYRINGE (ML) INJECTION PRN
Status: DISCONTINUED | OUTPATIENT
Start: 2020-10-26 | End: 2020-10-26 | Stop reason: HOSPADM

## 2020-10-26 RX ORDER — DIPHENHYDRAMINE HYDROCHLORIDE 50 MG/ML
12.5 INJECTION INTRAMUSCULAR; INTRAVENOUS
Status: DISCONTINUED | OUTPATIENT
Start: 2020-10-26 | End: 2020-10-26 | Stop reason: HOSPADM

## 2020-10-26 RX ORDER — PROPOFOL 10 MG/ML
INJECTION, EMULSION INTRAVENOUS PRN
Status: DISCONTINUED | OUTPATIENT
Start: 2020-10-26 | End: 2020-10-26 | Stop reason: SDUPTHER

## 2020-10-26 RX ORDER — LABETALOL HYDROCHLORIDE 5 MG/ML
5 INJECTION, SOLUTION INTRAVENOUS EVERY 10 MIN PRN
Status: DISCONTINUED | OUTPATIENT
Start: 2020-10-26 | End: 2020-10-26 | Stop reason: HOSPADM

## 2020-10-26 RX ORDER — OXYCODONE HYDROCHLORIDE AND ACETAMINOPHEN 5; 325 MG/1; MG/1
2 TABLET ORAL PRN
Status: DISCONTINUED | OUTPATIENT
Start: 2020-10-26 | End: 2020-10-26 | Stop reason: HOSPADM

## 2020-10-26 RX ORDER — SODIUM CHLORIDE, SODIUM LACTATE, POTASSIUM CHLORIDE, CALCIUM CHLORIDE 600; 310; 30; 20 MG/100ML; MG/100ML; MG/100ML; MG/100ML
INJECTION, SOLUTION INTRAVENOUS CONTINUOUS
Status: DISCONTINUED | OUTPATIENT
Start: 2020-10-26 | End: 2020-10-26 | Stop reason: HOSPADM

## 2020-10-26 RX ORDER — MORPHINE SULFATE 2 MG/ML
1 INJECTION, SOLUTION INTRAMUSCULAR; INTRAVENOUS EVERY 5 MIN PRN
Status: DISCONTINUED | OUTPATIENT
Start: 2020-10-26 | End: 2020-10-26 | Stop reason: HOSPADM

## 2020-10-26 RX ORDER — HYDRALAZINE HYDROCHLORIDE 20 MG/ML
5 INJECTION INTRAMUSCULAR; INTRAVENOUS EVERY 10 MIN PRN
Status: DISCONTINUED | OUTPATIENT
Start: 2020-10-26 | End: 2020-10-26 | Stop reason: HOSPADM

## 2020-10-26 RX ORDER — OXYCODONE HYDROCHLORIDE AND ACETAMINOPHEN 5; 325 MG/1; MG/1
1 TABLET ORAL PRN
Status: DISCONTINUED | OUTPATIENT
Start: 2020-10-26 | End: 2020-10-26 | Stop reason: HOSPADM

## 2020-10-26 RX ORDER — ONDANSETRON 2 MG/ML
4 INJECTION INTRAMUSCULAR; INTRAVENOUS PRN
Status: DISCONTINUED | OUTPATIENT
Start: 2020-10-26 | End: 2020-10-26 | Stop reason: HOSPADM

## 2020-10-26 RX ORDER — PROMETHAZINE HYDROCHLORIDE 25 MG/ML
6.25 INJECTION, SOLUTION INTRAMUSCULAR; INTRAVENOUS
Status: DISCONTINUED | OUTPATIENT
Start: 2020-10-26 | End: 2020-10-26 | Stop reason: HOSPADM

## 2020-10-26 RX ORDER — SODIUM CHLORIDE 0.9 % (FLUSH) 0.9 %
10 SYRINGE (ML) INJECTION EVERY 12 HOURS SCHEDULED
Status: DISCONTINUED | OUTPATIENT
Start: 2020-10-26 | End: 2020-10-26 | Stop reason: HOSPADM

## 2020-10-26 RX ORDER — FENTANYL CITRATE 50 UG/ML
INJECTION, SOLUTION INTRAMUSCULAR; INTRAVENOUS PRN
Status: DISCONTINUED | OUTPATIENT
Start: 2020-10-26 | End: 2020-10-26 | Stop reason: SDUPTHER

## 2020-10-26 RX ORDER — MORPHINE SULFATE 2 MG/ML
2 INJECTION, SOLUTION INTRAMUSCULAR; INTRAVENOUS EVERY 5 MIN PRN
Status: DISCONTINUED | OUTPATIENT
Start: 2020-10-26 | End: 2020-10-26 | Stop reason: HOSPADM

## 2020-10-26 RX ADMIN — PROPOFOL 50 MG: 10 INJECTION, EMULSION INTRAVENOUS at 07:34

## 2020-10-26 RX ADMIN — ONDANSETRON 4 MG: 2 INJECTION, SOLUTION INTRAMUSCULAR; INTRAVENOUS at 07:46

## 2020-10-26 RX ADMIN — SODIUM CHLORIDE, POTASSIUM CHLORIDE, SODIUM LACTATE AND CALCIUM CHLORIDE: 600; 310; 30; 20 INJECTION, SOLUTION INTRAVENOUS at 06:37

## 2020-10-26 RX ADMIN — FENTANYL CITRATE 100 MCG: 50 INJECTION INTRAMUSCULAR; INTRAVENOUS at 07:33

## 2020-10-26 RX ADMIN — LIDOCAINE HYDROCHLORIDE 60 MG: 20 INJECTION, SOLUTION EPIDURAL; INFILTRATION; INTRACAUDAL; PERINEURAL at 07:33

## 2020-10-26 RX ADMIN — PROPOFOL 250 MG: 10 INJECTION, EMULSION INTRAVENOUS at 07:33

## 2020-10-26 RX ADMIN — IBUPROFEN 200 MG: 200 TABLET, FILM COATED ORAL at 09:45

## 2020-10-26 RX ADMIN — DEXAMETHASONE SODIUM PHOSPHATE 8 MG: 10 INJECTION INTRAMUSCULAR; INTRAVENOUS at 07:46

## 2020-10-26 RX ADMIN — Medication 2 G: at 07:31

## 2020-10-26 RX ADMIN — SODIUM CHLORIDE, POTASSIUM CHLORIDE, SODIUM LACTATE AND CALCIUM CHLORIDE: 600; 310; 30; 20 INJECTION, SOLUTION INTRAVENOUS at 07:30

## 2020-10-26 RX ADMIN — LIDOCAINE HYDROCHLORIDE 0.1 ML: 10 INJECTION, SOLUTION EPIDURAL; INFILTRATION; INTRACAUDAL; PERINEURAL at 06:36

## 2020-10-26 ASSESSMENT — PULMONARY FUNCTION TESTS
PIF_VALUE: 13
PIF_VALUE: 6
PIF_VALUE: 20
PIF_VALUE: 1
PIF_VALUE: 30
PIF_VALUE: 21
PIF_VALUE: 18
PIF_VALUE: 20
PIF_VALUE: 13
PIF_VALUE: 0
PIF_VALUE: 21
PIF_VALUE: 37
PIF_VALUE: 24
PIF_VALUE: 27
PIF_VALUE: 20
PIF_VALUE: 9
PIF_VALUE: 20
PIF_VALUE: 0
PIF_VALUE: 22
PIF_VALUE: 20
PIF_VALUE: 6
PIF_VALUE: 2
PIF_VALUE: 20
PIF_VALUE: 14
PIF_VALUE: 20
PIF_VALUE: 15
PIF_VALUE: 1
PIF_VALUE: 7
PIF_VALUE: 1
PIF_VALUE: 23
PIF_VALUE: 23
PIF_VALUE: 8
PIF_VALUE: 13
PIF_VALUE: 13
PIF_VALUE: 1
PIF_VALUE: 21
PIF_VALUE: 14
PIF_VALUE: 1
PIF_VALUE: 20
PIF_VALUE: 21
PIF_VALUE: 32
PIF_VALUE: 22
PIF_VALUE: 21
PIF_VALUE: 20
PIF_VALUE: 11
PIF_VALUE: 21
PIF_VALUE: 20
PIF_VALUE: 1
PIF_VALUE: 4
PIF_VALUE: 23
PIF_VALUE: 19
PIF_VALUE: 1
PIF_VALUE: 14
PIF_VALUE: 1
PIF_VALUE: 22
PIF_VALUE: 23
PIF_VALUE: 1
PIF_VALUE: 6
PIF_VALUE: 9

## 2020-10-26 ASSESSMENT — PAIN - FUNCTIONAL ASSESSMENT
PAIN_FUNCTIONAL_ASSESSMENT: 0-10
PAIN_FUNCTIONAL_ASSESSMENT: PREVENTS OR INTERFERES SOME ACTIVE ACTIVITIES AND ADLS

## 2020-10-26 ASSESSMENT — PAIN DESCRIPTION - LOCATION: LOCATION: HEAD

## 2020-10-26 ASSESSMENT — PAIN DESCRIPTION - DESCRIPTORS: DESCRIPTORS: TINGLING;ACHING

## 2020-10-26 ASSESSMENT — PAIN SCALES - GENERAL
PAINLEVEL_OUTOF10: 0
PAINLEVEL_OUTOF10: 3
PAINLEVEL_OUTOF10: 0
PAINLEVEL_OUTOF10: 0

## 2020-10-26 NOTE — OP NOTE
the skin closed using horizontal mattress #5-0 nylon suture. A separate 5-6 cm curvilinear incision was made just behind the medial epicondyle of the left elbow, through skin only. Meticulous hemostasis. Transversing sensory branches to the medial elbow and medial forearm were identified and protected. Cisneros's ligament and the cubital tunnel were identified and isolated. Cisneros's ligament was released completely under direct visualization while protecting the underlying nerve. It was quite tight at Cisneros's ligament. There was an hourglass shape to the nerve but the nerve appeared to have normal coloring. Nerve was completely released proximally. Release was taken distally to the FCU fascia. The deep and superficial FCU fascial layers were released under direct visualization carefully protecting the small nerve branches. The nerve was nicely decompressed at this time. A small vessel running along the nerve was well filled. The elbow was placed through a range of motion, there was no crepitance, no instability, no subluxation or perching of the nerve. Transposition was not necessary. Wound was copiously irrigated. No other abnormalities noted. Skin was closed with interrupted nylon suture. Tourniquet was deflated and all fingers were warm and well perfused. Large sterile dressing was placed on the upper extremity. Patient was awoken from sedation, tolerated the case well, was taken to the post anesthesia recovery unit in good condition. No complications. Findings:  No aberrant motor branch    Intervention:  1% Xylocaine, 0.25% Marcaine, without epinephrine as local injection    Other Notes: Follow-up in 7-10 days for wound inspection and suture removal.  Progressive range of motion of the elbow, wrist, and hand. Progressive activities. Referral to the hand therapist for a carpal tunnel and cubital tunnel program, including scar treatments.           Veronica Evans MD  Hand & Upper Extremity [facility] partner of [facility] ([facility])

## 2020-10-26 NOTE — PROGRESS NOTES
Admitted to PACU. Respirations easy and regular. NSR on moniter. Left arm elevated with ice. Left fingers warm and pink with good cap refill.

## 2020-10-26 NOTE — H&P
I have reviewed the History & Physical and examined the patient and find no relevant changes. EMG reviewed. He called as nonop measures not working. Left side worse. I have reviewed with the patient and/or family the risks, benefits, and alternatives to the procedure(s). All questions and concerns were addressed. Consent is on the chart. Surgical sites, left elbow/left wrist, have been marked by Dr Zulay Hutton and confirmed by the patient. The risks and benefits were discussed thoroughly involving both procedures. These included, but were not limited to infection, tendon or nerve injury, scar or thenar or elbow sensitivity, need for transfusion, adverse effects of anesthesia, incomplete relief of numbness, pain, and/or weakness. If ulnar nerve unstable, transposition would be indicated. The patient was counseled at length about the risks of jacki Covid-19 during their perioperative period and any recovery window from their procedure. The patient was made aware that jacki Covid-19  may worsen their prognosis for recovering from their procedure  and lend to a higher morbidity and/or mortality risk. All material risks, benefits, and reasonable alternatives including postponing the procedure were discussed. The patient does wish to proceed with the procedure at this time. All questions and concerns were addressed today. Patient is in agreement with the plan.         Angelica Seay MD  Hand & Upper Extremity Surgery  [facility]

## 2020-10-26 NOTE — ANESTHESIA POSTPROCEDURE EVALUATION
Department of Anesthesiology  Postprocedure Note    Patient: Dipti Vega  MRN: [id number]  YOB: 1960  Date of evaluation: 10/26/2020  Time:  12:49 PM     Procedure Summary     Date:  10/26/20 Room / Location:  [facility] EG OR 01 / [facility]    Anesthesia Start:  0730 Anesthesia Stop:  9083    Procedure:  LEFT CARPAL TUNNEL RELEASE, LEFT CUBITAL TUNNEL RELEASE (Left Arm Lower) Diagnosis:  (CARPAL TUNNEL SYNDROME, CUBITAL TUNNEL SYNDROME)    Surgeon:  Kyle Asif MD Responsible Provider:  Asuncion Goss MD    Anesthesia Type:  general ASA Status:  2          Anesthesia Type: general    Didier Phase I: Didier Score: 10    Didier Phase II: Didier Score: 10    Last vitals: Reviewed and per EMR flowsheets.        Anesthesia Post Evaluation    Comments: Postoperative Anesthesia Note    Name:    Dipti Vega  MRN:      [id number]    Patient Vitals in the past 12 hrs:  10/26/20 0935, BP:133/84, Pulse:66, Resp:16  10/26/20 0919, BP:138/86, Temp:97 °F (36.1 °C), Pulse:68, Resp:16, SpO2:95 %  10/26/20 0905, BP:(!) 141/81, Pulse:66, Resp:16  10/26/20 0850, BP:(!) 144/84, Pulse:71, Resp:16, SpO2:95 %  10/26/20 0838, BP:(!) 148/87, Pulse:69, Resp:16, SpO2:95 %  10/26/20 0833, BP:(!) 148/82, Pulse:79, Resp:18, SpO2:95 %  10/26/20 0828, BP:(!) 152/81, Temp:98 °F (36.7 °C), Temp src:Temporal, Pulse:81, Resp:17, SpO2:95 %  10/26/20 0823, BP:(!) 156/83, Temp:98 °F (36.7 °C), Pulse:81, Resp:16, SpO2:95 %  10/26/20 0621, BP:(!) 148/84, Temp:98.1 °F (36.7 °C), Pulse:67, Resp:20, SpO2:99 %  10/26/20 0611, Temp src:Temporal, Height:6' (1.829 m), Weight:220 lb (99.8 kg)     LABS:    CBC  No results found for: WBC, HGB, HCT, PLT  RENAL  No results found for: NA, K, CL, CO2, BUN, CREATININE, GLUCOSE  COAGS  No results found for: PROTIME, INR, APTT    Intake & Output:  @69GKTF@    Nausea & Vomiting:  No    Level of Consciousness:  Awake    Pain Assessment:  Adequate analgesia    Anesthesia Complications:  No apparent anesthetic complications    SUMMARY      Vital signs stable  OK to discharge from Stage I post anesthesia care.   Care transferred from Anesthesiology department on discharge from perioperative area

## 2020-10-26 NOTE — ANESTHESIA PRE PROCEDURE
no history of anesthetic complications:   Airway: Mallampati: III  TM distance: >3 FB   Neck ROM: full  Mouth opening: > = 3 FB Dental: normal exam         Pulmonary:Negative Pulmonary ROS and normal exam  breath sounds clear to auscultation                             Cardiovascular:  Exercise tolerance: good (>4 METS),   (+) hypertension:,         Rhythm: regular  Rate: normal                    Neuro/Psych:   Negative Neuro/Psych ROS              GI/Hepatic/Renal: Neg GI/Hepatic/Renal ROS            Endo/Other: Negative Endo/Other ROS                    Abdominal:           Vascular: negative vascular ROS. Pre-Operative Diagnosis: CARPAL TUNNEL SYNDROME, CUBITAL TUNNEL SYNDROME    61 y.o.   BMI:  Body mass index is 29.84 kg/m². Vitals:    10/23/20 1256 10/26/20 0611 10/26/20 0621   BP:   (!) 148/84   Pulse:   67   Resp:   20   Temp:   98.1 °F (36.7 °C)   TempSrc:  Temporal    SpO2:   99%   Weight: 220 lb (99.8 kg) 220 lb (99.8 kg)    Height: 6' (1.829 m) 6' (1.829 m)        Allergies   Allergen Reactions    Penicillins        Social History     Tobacco Use    Smoking status: Never Smoker    Smokeless tobacco: Never Used   Substance Use Topics    Alcohol use: Yes     Alcohol/week: 2.0 standard drinks     Types: 2 Cans of beer per week       LABS:    CBC  No results found for: WBC, HGB, HCT, PLT  RENAL  No results found for: NA, K, CL, CO2, BUN, CREATININE, GLUCOSE  COAGS  No results found for: PROTIME, INR, APTT     Anesthesia Plan      general     ASA 2     (I discussed with the patient the risks and benefits of PIV, anesthesia, IV Narcotics, PACU. All questions were answered the patient agrees with the plan and wishes to proceed)  Induction: intravenous.                           Mark Anthony Banks MD   10/26/2020

## 2020-11-04 ENCOUNTER — OFFICE VISIT (OUTPATIENT)
Dept: ORTHOPEDIC SURGERY | Age: 60
End: 2020-11-04

## 2020-11-04 VITALS — BODY MASS INDEX: 29.16 KG/M2 | WEIGHT: 220 LBS | HEIGHT: 73 IN

## 2020-11-04 PROCEDURE — 99024 POSTOP FOLLOW-UP VISIT: CPT | Performed by: ORTHOPAEDIC SURGERY

## 2020-11-04 NOTE — PROGRESS NOTES
Chief Complaint   Patient presents with    Post-Op Check     s/p 10/26/2020 left carpal tunnel release, left cubital tunnel releases       HISTORY OF PRESENT ILLNESS:  Jojo Arnold is a 61 y.o.  patient here for post-op follow up after left carpal tunnel release, left cubital tunnel release. Surgery was now 9 days ago. The patient is doing quite well overall. Patient states numbness in the left hand is gone except for some residual tingling of the volar thumb which she states has been numb since July. He is back to farming. ROS:  ROS neg     MEDICAL HISTORY:  Patient's medications, allergies, past medical, surgical, social and family histories were reviewed and updated as appropriate. PHYSICAL EXAMINATION:  Alert and pleasant, no distress. The left hand, elbow, and wrist is examined. The wounds are healing without signs of infection or dehiscense. Mild swelling noted. There is satisfactory range of motion of the wrist and fingers. Thumb abduction is present. Fingers are well perfused. Good elbow motion without ulnar nerve instability. No sign of bursitis. IMPRESSION AND PLAN:  bilateral carpal tunnel sydrome, Bilateral cubital tunnel syndrome  Doing well after left carpal tunnel release, left cubital tunnel release. We will plan for suture removal and discuss appropriate wound care. We discussed scar and thenar massage, progressive ROM of hand and fingers. Activities may be advanced depending on comfort. If patient desires hand therapy at any point during recovery, we will be happy to place this order. 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 Outpatient procedure in a similar fashion to the left side. All questions and concerns were addressed today.   Patient is in agreement with the plan.         Merle Dunbar MD  Hand & Upper Extremity Surgery  [address] [facility] [place] [facility] ([facility])

## (undated) DEVICE — Device

## (undated) DEVICE — GLOVE SURG SZ 7 L12IN FNGR THK94MIL STD WHT ISOLEX LTX FREE

## (undated) DEVICE — SOLUTION,SALINE,IRRGATION,500ML,STRL: Brand: MEDLINE

## (undated) DEVICE — SPLINT ORTH W4XL15IN LAYERED FBRGLS FOAM PD BRTH BK MOLD

## (undated) DEVICE — ROYAL SILK SURGICAL GOWN, XL: Brand: CONVERTORS

## (undated) DEVICE — STERILE LATEX POWDER-FREE SURGICAL GLOVESWITH NITRILE COATING: Brand: PROTEXIS

## (undated) DEVICE — CURITY ABDOMINAL PAD: Brand: CURITY

## (undated) DEVICE — COTTON UNDERCAST PADDING,REGULAR FINISH: Brand: WEBRIL

## (undated) DEVICE — BANDAGE COMPR W2INXL5.5YD BGE POLY COT BLEND YARN HNY STRP

## (undated) DEVICE — SUTURE MCRYL SZ 4-0 L18IN ABSRB UD L19MM PS-2 3/8 CIR PRIM Y496G

## (undated) DEVICE — Z CONVERTED USE 2273164 BANDAGE COMPR W4INXL4 1/2YD E EC SGL LAYERED CLP CLSR ECONO

## (undated) DEVICE — 3M™ IOBAN™ 2 ANTIMICROBIAL INCISE DRAPE 6640EZ: Brand: IOBAN™ 2

## (undated) DEVICE — SUTURE COAT VCRL SZ 4-0 L18IN ABSRB UD L19MM PS-2 1/2 CIR J496G

## (undated) DEVICE — DRAPE 33X23IN INCISE ANTIMICROB IOBAN 2

## (undated) DEVICE — COTTON UNDERCAST PADDING,CRIMPED FINISH: Brand: WEBRIL

## (undated) DEVICE — 3M™ COBAN™ NL STERILE NON-LATEX SELF-ADHERENT WRAP, 2084S, 4 IN X 5 YD (10 CM X 4,5 M), 18 ROLLS/CASE: Brand: 3M™ COBAN™